# Patient Record
Sex: MALE | Race: WHITE
[De-identification: names, ages, dates, MRNs, and addresses within clinical notes are randomized per-mention and may not be internally consistent; named-entity substitution may affect disease eponyms.]

---

## 2017-05-26 ENCOUNTER — HOSPITAL ENCOUNTER (OUTPATIENT)
Dept: HOSPITAL 61 - PCVCCLINIC | Age: 77
Discharge: HOME | End: 2017-05-26
Attending: INTERNAL MEDICINE
Payer: MEDICARE

## 2017-05-26 DIAGNOSIS — Z79.899: ICD-10-CM

## 2017-05-26 DIAGNOSIS — E11.9: ICD-10-CM

## 2017-05-26 DIAGNOSIS — Z88.0: ICD-10-CM

## 2017-05-26 DIAGNOSIS — E78.5: ICD-10-CM

## 2017-05-26 DIAGNOSIS — I44.1: ICD-10-CM

## 2017-05-26 DIAGNOSIS — Z95.1: ICD-10-CM

## 2017-05-26 DIAGNOSIS — I25.10: Primary | ICD-10-CM

## 2017-05-26 DIAGNOSIS — Z79.82: ICD-10-CM

## 2017-05-26 DIAGNOSIS — Z79.84: ICD-10-CM

## 2017-05-26 DIAGNOSIS — Z87.891: ICD-10-CM

## 2017-05-26 DIAGNOSIS — I10: ICD-10-CM

## 2017-05-26 PROCEDURE — 93005 ELECTROCARDIOGRAM TRACING: CPT

## 2017-05-26 PROCEDURE — G0463 HOSPITAL OUTPT CLINIC VISIT: HCPCS

## 2017-12-18 ENCOUNTER — HOSPITAL ENCOUNTER (OUTPATIENT)
Dept: HOSPITAL 61 - PCVCCLINIC | Age: 77
Discharge: HOME | End: 2017-12-18
Attending: INTERNAL MEDICINE
Payer: MEDICARE

## 2017-12-18 DIAGNOSIS — Z87.891: ICD-10-CM

## 2017-12-18 DIAGNOSIS — I10: ICD-10-CM

## 2017-12-18 DIAGNOSIS — I25.10: Primary | ICD-10-CM

## 2017-12-18 DIAGNOSIS — Z79.899: ICD-10-CM

## 2017-12-18 DIAGNOSIS — Z95.1: ICD-10-CM

## 2017-12-18 DIAGNOSIS — E11.9: ICD-10-CM

## 2017-12-18 DIAGNOSIS — E78.2: ICD-10-CM

## 2017-12-18 DIAGNOSIS — Z88.0: ICD-10-CM

## 2017-12-18 DIAGNOSIS — Z79.82: ICD-10-CM

## 2017-12-18 PROCEDURE — 93005 ELECTROCARDIOGRAM TRACING: CPT

## 2017-12-18 PROCEDURE — 80061 LIPID PANEL: CPT

## 2017-12-18 PROCEDURE — G0463 HOSPITAL OUTPT CLINIC VISIT: HCPCS

## 2018-04-06 ENCOUNTER — HOSPITAL ENCOUNTER (OUTPATIENT)
Dept: HOSPITAL 61 - PCVCCLINIC | Age: 78
Discharge: HOME | End: 2018-04-06
Attending: INTERNAL MEDICINE
Payer: MEDICARE

## 2018-04-06 DIAGNOSIS — E78.5: ICD-10-CM

## 2018-04-06 DIAGNOSIS — Z87.891: ICD-10-CM

## 2018-04-06 DIAGNOSIS — Z79.82: ICD-10-CM

## 2018-04-06 DIAGNOSIS — Z79.899: ICD-10-CM

## 2018-04-06 DIAGNOSIS — I25.10: Primary | ICD-10-CM

## 2018-04-06 DIAGNOSIS — Z95.1: ICD-10-CM

## 2018-04-06 DIAGNOSIS — E11.9: ICD-10-CM

## 2018-04-06 DIAGNOSIS — Z88.0: ICD-10-CM

## 2018-04-06 DIAGNOSIS — I44.2: ICD-10-CM

## 2018-04-06 DIAGNOSIS — I10: ICD-10-CM

## 2018-04-06 DIAGNOSIS — I65.23: ICD-10-CM

## 2018-04-06 DIAGNOSIS — R94.31: ICD-10-CM

## 2018-04-06 DIAGNOSIS — Z79.84: ICD-10-CM

## 2018-04-06 PROCEDURE — 93005 ELECTROCARDIOGRAM TRACING: CPT

## 2018-05-07 ENCOUNTER — HOSPITAL ENCOUNTER (OUTPATIENT)
Dept: HOSPITAL 61 - PCVCIMAG | Age: 78
Discharge: HOME | End: 2018-05-07
Attending: INTERNAL MEDICINE
Payer: MEDICARE

## 2018-05-07 DIAGNOSIS — Z87.891: ICD-10-CM

## 2018-05-07 DIAGNOSIS — I25.10: ICD-10-CM

## 2018-05-07 DIAGNOSIS — Z95.1: ICD-10-CM

## 2018-05-07 DIAGNOSIS — I65.23: Primary | ICD-10-CM

## 2018-05-07 DIAGNOSIS — E11.9: ICD-10-CM

## 2018-05-07 DIAGNOSIS — Z88.0: ICD-10-CM

## 2018-05-07 DIAGNOSIS — E78.5: ICD-10-CM

## 2018-05-07 DIAGNOSIS — I44.1: ICD-10-CM

## 2018-05-07 DIAGNOSIS — I10: ICD-10-CM

## 2018-05-07 DIAGNOSIS — Z79.82: ICD-10-CM

## 2018-05-07 DIAGNOSIS — Z79.899: ICD-10-CM

## 2018-05-07 PROCEDURE — 93880 EXTRACRANIAL BILAT STUDY: CPT

## 2018-05-07 PROCEDURE — 93005 ELECTROCARDIOGRAM TRACING: CPT

## 2018-05-07 PROCEDURE — 80061 LIPID PANEL: CPT

## 2018-12-03 ENCOUNTER — HOSPITAL ENCOUNTER (OUTPATIENT)
Dept: HOSPITAL 61 - PCVCCLINIC | Age: 78
Discharge: HOME | End: 2018-12-03
Attending: INTERNAL MEDICINE
Payer: MEDICARE

## 2018-12-03 DIAGNOSIS — E11.9: ICD-10-CM

## 2018-12-03 DIAGNOSIS — I65.23: ICD-10-CM

## 2018-12-03 DIAGNOSIS — I25.10: Primary | ICD-10-CM

## 2018-12-03 DIAGNOSIS — Z87.891: ICD-10-CM

## 2018-12-03 DIAGNOSIS — Z95.1: ICD-10-CM

## 2018-12-03 DIAGNOSIS — I10: ICD-10-CM

## 2018-12-03 DIAGNOSIS — Z88.0: ICD-10-CM

## 2018-12-03 DIAGNOSIS — I48.2: ICD-10-CM

## 2018-12-03 DIAGNOSIS — E78.5: ICD-10-CM

## 2018-12-03 DIAGNOSIS — Z79.84: ICD-10-CM

## 2018-12-03 PROCEDURE — G0463 HOSPITAL OUTPT CLINIC VISIT: HCPCS

## 2018-12-03 PROCEDURE — 80061 LIPID PANEL: CPT

## 2018-12-03 PROCEDURE — 93005 ELECTROCARDIOGRAM TRACING: CPT

## 2019-06-06 ENCOUNTER — HOSPITAL ENCOUNTER (OUTPATIENT)
Dept: HOSPITAL 61 - PCVCIMAG | Age: 79
Discharge: HOME | End: 2019-06-06
Attending: INTERNAL MEDICINE
Payer: MEDICARE

## 2019-06-06 DIAGNOSIS — E78.5: ICD-10-CM

## 2019-06-06 DIAGNOSIS — Z87.891: ICD-10-CM

## 2019-06-06 DIAGNOSIS — I48.91: ICD-10-CM

## 2019-06-06 DIAGNOSIS — I48.2: ICD-10-CM

## 2019-06-06 DIAGNOSIS — I65.23: Primary | ICD-10-CM

## 2019-06-06 DIAGNOSIS — E11.9: ICD-10-CM

## 2019-06-06 DIAGNOSIS — Z95.1: ICD-10-CM

## 2019-06-06 DIAGNOSIS — I10: ICD-10-CM

## 2019-06-06 DIAGNOSIS — I25.10: ICD-10-CM

## 2019-06-06 DIAGNOSIS — I08.1: ICD-10-CM

## 2019-06-06 PROCEDURE — G0463 HOSPITAL OUTPT CLINIC VISIT: HCPCS

## 2019-06-06 PROCEDURE — 36415 COLL VENOUS BLD VENIPUNCTURE: CPT

## 2019-06-06 PROCEDURE — 93880 EXTRACRANIAL BILAT STUDY: CPT

## 2019-06-06 PROCEDURE — 93306 TTE W/DOPPLER COMPLETE: CPT

## 2019-06-06 PROCEDURE — 80061 LIPID PANEL: CPT

## 2019-06-06 PROCEDURE — 93005 ELECTROCARDIOGRAM TRACING: CPT

## 2019-06-06 NOTE — PCVCIMAG
--------------- APPROVED REPORT 

--------------





Indications

Stenosis



Risk Factors

Hypertension: 

Hyperlipidemia 

Diabetes, 

CAD



Doppler Spectral Velocity Analysis

PSV  /  EDVPSV  /  EDV

ECA (R)     159 / 4 cm/sECA (L)     188 / 7 cm/s



dICA (R)    92 / 19 cm/sdICA (L)     73 / 14 cm/s

Mora (R)     86 / 19 cm/smICA (L)     83 / 14 cm/s

pICA (R)     165 / 21 cm/spICA (L)     121 / 12 cm/s



Bulb (R)        112 / 16 cm/sBulb (L)         79 / 7 cm/s



dCCA (R)     132 / 14 cm/sdCCA (L)     123 / 20 cm/s

mCCA (R)    134 / 14 cm/smCCA (L)    120 / 17 cm/s



Vert (R)     36 / 0 cm/sVert (L)     75 / 9 cm/s

ICA/CCA     1.25ICA/CCA     0.98



Basic Measurements

Blood Pressure:                                                 

Pulses:                       

                                Right           Left               

             RightLeft

Brachial(Sitting)               136/81wdMh154/72mmHgTemporal     





Real Time B-Mode Imaging

Vert. (R)AntegradeVert. (L)Antegrade



Findings

The right carotid bulb has moderately severe calcified plaque.

The right proximal internal carotid artery shows 50-60% stenosis.

The right common carotid artery shows 40-50% stenosis.

The right external carotid artery shows no significant stenosis.

The left carotid bulb has moderate  plaque.

The left proximal internal carotid artery shows 40-50% stenosis.

The left common carotid artery shows no significant stenosis.

The left external carotid artery shows >50% 

stenosis.



Conclusion

1. Right internal carotid artery stenosis (50-60%).

2. Right common carotid artery stenosis (40-50%)

3. Left internal carotid artery stenosis (40-50%)

4. Antegrade vertebral flow



Similar to a study dated May 2018

## 2019-06-06 NOTE — PCVCIMAG
--------------- APPROVED REPORT --------------





Study performed:  2019 14:23:43



EXAM: Comprehensive 2D, Doppler, and color-flow 

Echocardiogram

Patient Location: Echo lab

Status:  routine



BSA:         1.95

HR: 65 bpmBP:          134/72 mmHg

Rhythm: Atrial Fibrillation



Other Information 

Study Quality: Adequate



Risk Factors: 

Cardiac Risk Factors:  HTN



Indications

Diabetes

Atrial Fibrillation

CAD

CABG



2D Dimensions

IVSd:  11.31 (7-11mm)

LVDd:  44.69 mm

PWd:  9.71 (7-11mm)Ascending Ao:  39.97 (22-36mm)

LVDs:  27.40 (25-40mm)

Left Atrium:  43.99 (27-40mm)

Aortic Root:  34.07 mm

LV Single Plane 4CH:  64.96 %

LV Single Plane 2CH:  67.23 %

Biplane EF:  65.4 %



Volumes

Left Atrial Volume (Systole)

Single Plane 4CH:  58.60 mLSingle Plane 2CH:  95.24 mL

LA ESV Index:  41.00 mL/m2



Aortic Valve

AoV Peak Rashawn.:  1.59 m/s

AO Peak Gr.:  10.10 mmHgLVOT Max P.08 mmHg

LVOT Max V:  1.01 m/s



Mitral Valve

E/A Ratio:  0.9

MV Decel. Time:  287.42 ms

MV E Max Rashawn.:  1.03 m/s

MV A Rashawn.:  1.16 m/s



Pulmonary Valve

PV Peak Rashawn.:  1.47 m/sPV Peak Gr.:  8.70 mmHg



Tricuspid Valve

TR Peak Rashawn.:  2.99 m/s

TR Peak Gr.:  35.79 mmHg



Left Ventricle

The left ventricle is normal size. There is normal LV segmental wall 

motion. There is normal left ventricular wall thickness. Left 

ventricular systolic function is normal. The left ventricular 

ejection fraction is within the normal range. LVEF is 65%. This study 

is not technically sufficient to allow evaluation of the LV diastolic 

function due to atrial fibrillation.



Right Ventricle

The right ventricle is normal size. The right ventricular systolic 

function is normal.



Atria

Left atrium is mild-moderately dilated. Right atrium is 

mild-moderately dilated.



Aortic Valve

The aortic valve is trileaflet, mildly sclerotic No aortic 

regurgitation is present. There is no aortic valvular 

stenosis.



Mitral Valve

The mitral valve is normal in structure. Mild mitral regurgitation. 

No evidence of mitral valve stenosis.



Tricuspid Valve

The tricuspid valve is normal in structure. Mild to moderate 

tricuspid regurgitation with PAP of 40 mmHg.



Pulmonic Valve

The pulmonary valve is normal in structure. Mild pulmonic 

regurgitation.



Great Vessels

The aortic root is normal in size. Ascending aorta is dilated (4.0 

cm). IVC is normal in size and collapses >50% with 

inspiration.



Pericardium

There is no pericardial effusion. There is no pleural 

effusion.



<Conclusion>

Left ventricular systolic function is normal.

There is normal LV segmental wall motion.

LVEF is 65%.

Both atria are mild-moderately dilated.

The aortic valve is trileaflet, mildly sclerotic. No aortic 

regurgitation or stenosis.

The mitral valve is normal in structure. Mild mitral 

regurgitation.

Mild to moderate tricuspid regurgitation with pulmonary artery 

pressure of 40 mmHg.

Ascending aorta is dilated (4.0 cm).

There is no pericardial effusion.

## 2019-12-11 ENCOUNTER — HOSPITAL ENCOUNTER (OUTPATIENT)
Dept: HOSPITAL 61 - PCVCCLINIC | Age: 79
Discharge: HOME | End: 2019-12-11
Attending: INTERNAL MEDICINE
Payer: MEDICARE

## 2019-12-11 DIAGNOSIS — Z87.891: ICD-10-CM

## 2019-12-11 DIAGNOSIS — I65.23: ICD-10-CM

## 2019-12-11 DIAGNOSIS — Z88.0: ICD-10-CM

## 2019-12-11 DIAGNOSIS — E11.9: ICD-10-CM

## 2019-12-11 DIAGNOSIS — Z79.899: ICD-10-CM

## 2019-12-11 DIAGNOSIS — I25.10: Primary | ICD-10-CM

## 2019-12-11 DIAGNOSIS — I10: ICD-10-CM

## 2019-12-11 DIAGNOSIS — Z79.84: ICD-10-CM

## 2019-12-11 DIAGNOSIS — Z95.1: ICD-10-CM

## 2019-12-11 DIAGNOSIS — I48.21: ICD-10-CM

## 2019-12-11 DIAGNOSIS — Z79.4: ICD-10-CM

## 2019-12-11 DIAGNOSIS — E78.5: ICD-10-CM

## 2019-12-11 PROCEDURE — G0463 HOSPITAL OUTPT CLINIC VISIT: HCPCS

## 2019-12-11 PROCEDURE — 36415 COLL VENOUS BLD VENIPUNCTURE: CPT

## 2019-12-11 PROCEDURE — 80061 LIPID PANEL: CPT

## 2019-12-11 PROCEDURE — 93005 ELECTROCARDIOGRAM TRACING: CPT
